# Patient Record
Sex: MALE | Race: OTHER
[De-identification: names, ages, dates, MRNs, and addresses within clinical notes are randomized per-mention and may not be internally consistent; named-entity substitution may affect disease eponyms.]

---

## 2019-11-09 ENCOUNTER — HOSPITAL ENCOUNTER (EMERGENCY)
Dept: HOSPITAL 74 - JERFT | Age: 24
Discharge: HOME | End: 2019-11-09
Payer: COMMERCIAL

## 2019-11-09 VITALS — HEART RATE: 64 BPM | DIASTOLIC BLOOD PRESSURE: 59 MMHG | SYSTOLIC BLOOD PRESSURE: 120 MMHG | TEMPERATURE: 98.3 F

## 2019-11-09 VITALS — BODY MASS INDEX: 23.9 KG/M2

## 2019-11-09 DIAGNOSIS — F17.210: ICD-10-CM

## 2019-11-09 DIAGNOSIS — S60.131A: Primary | ICD-10-CM
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
## 2019-11-09 PROCEDURE — 2W3JX1Z IMMOBILIZATION OF RIGHT FINGER USING SPLINT: ICD-10-PCS

## 2019-11-09 PROCEDURE — 3E0234Z INTRODUCTION OF SERUM, TOXOID AND VACCINE INTO MUSCLE, PERCUTANEOUS APPROACH: ICD-10-PCS

## 2019-11-09 NOTE — PDOC
History of Present Illness





- General


Chief Complaint: Injury


Stated Complaint: INJURY


Time Seen by Provider: 11/09/19 15:01


History Source: Patient


Exam Limitations: No Limitations (R middle finger crush injury at work 1:15)





Past History





- Travel


Traveled outside of the country in the last 30 days: No





- Past Medical History


Allergies/Adverse Reactions: 


 Allergies











Allergy/AdvReac Type Severity Reaction Status Date / Time


 


No Known Allergies Allergy   Verified 11/09/19 14:35











COPD: No





- Psycho Social/Smoking Cessation Hx


Smoking History: Current every day smoker


Have you smoked in the past 12 months: Yes


Number of Cigarettes Smoked Daily: 20


Information on smoking cessation initiated: Yes


Hx Alcohol Use: No


Drug/Substance Use Hx: No





**Review of Systems





- Review of Systems


Constitutional: No: Chills, Fever


Musculoskeletal: Yes: Joint Pain (R middle finger injury)





*Physical Exam





- Vital Signs


 Last Vital Signs











Temp Pulse Resp BP Pulse Ox


 


 98.3 F   64   18   120/59 L  99 


 


 11/09/19 14:36  11/09/19 14:36  11/09/19 14:36  11/09/19 14:36  11/09/19 14:36














- Physical Exam


General Appearance: Yes: Nourished


Extremity: positive: Normal Range of Motion, Tender (R middle finger: + 

hematoma in nail noted, nail bed intact. + mild blood noted as swell, FROM in 

finger)


Neurologic: positive: CNs II-XII NML intact, Fully Oriented, Alert, Normal Mood/

Affect, Normal Response, Motor Strength 5/5





ED Treatment Course





- RADIOLOGY


Radiology Studies Ordered: 














 Category Date Time Status


 


 FINGER(S) RIGHT [RAD] Stat Radiology  11/09/19 15:52 Ordered














Medical Decision Making





- Medical Decision Making





11/09/19 15:56


23 years old male right-hand-dominant presents with right middle finger crush 

injury that occurred at work at [employer] this afternoon.


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  He 

reports he was lifting a shaving dish when the lid closed accidentally on his 

finger.


he is unsure of last tetanus


He notified his supervisor about incident


On examination there is evidence of subungual hematoma mild bleeding noted


 nail is intact he does have full range of motion in the finger


Tetanus updated Tylenol for pain and x-ray


11/09/19 15:59





11/09/19 18:57








Discharge





- Discharge Information


Problems reviewed: Yes


Clinical Impression/Diagnosis: 


Hematoma, subungual, finger


Qualifiers:


 Encounter type: initial encounter Qualified Code(s): S60.10XA - Contusion of 

unspecified finger with damage to nail, initial encounter





Condition: Stable


Disposition: HOME





- Admission


No





- Additional Discharge Information


Prescription Drug Monitoring Program (I-STOP) results: I-STOP not reviewed





- Follow up/Referral


Referrals: 


Rafy Camacho MD [Staff Physician] - 





- Patient Discharge Instructions


Patient Printed Discharge Instructions:  DI for Subungual Hematoma


Additional Instructions: 


Your preliminary x-ray was negative for acute fracture or dislocation


Take Tylenol for pain


He can follow-up with orthopedics if the pain persist


Keep fingers splint on for the next few days


Return to the emergency room if worsening symptoms occurs





- Post Discharge Activity


Work/Back to School Note:  Back to Work